# Patient Record
Sex: FEMALE | Race: WHITE
[De-identification: names, ages, dates, MRNs, and addresses within clinical notes are randomized per-mention and may not be internally consistent; named-entity substitution may affect disease eponyms.]

---

## 2017-01-30 ENCOUNTER — HOSPITAL ENCOUNTER (OUTPATIENT)
Dept: HOSPITAL 45 - C.MAMM | Age: 42
Discharge: HOME | End: 2017-01-30
Payer: COMMERCIAL

## 2017-01-30 DIAGNOSIS — Z12.31: Primary | ICD-10-CM

## 2017-01-30 NOTE — MAMMOGRAPHY REPORT
BILATERAL DIGITAL SCREENING MAMMOGRAM TOMOSYNTHESIS WITH CAD: 1/30/2017

CLINICAL HISTORY: Routine screening.  Patient has no complaints.  





TECHNIQUE:  Breast tomosynthesis in addition to standard 2D mammography was performed. Current study
 was also evaluated with a Computer Aided Detection (CAD) system.  



COMPARISON: Comparison is made to exams dated:  1/27/2016 mammogram, 1/27/2016 ultrasound - [facility], and 1/7/2010 mammogram - [facility].   



BREAST COMPOSITION:  The tissue of both breasts is almost entirely fatty.  



FINDINGS: There is evidence of prior reduction mammoplasty with scattered benign-appearing calcifica
tions in the breasts.  No suspicious mass, architectural distortion or cluster of microcalcification
s is seen.  



IMPRESSION:  ACR BI-RADS CATEGORY 1: NEGATIVE

There is no mammographic evidence of malignancy. A 1 year screening mammogram is recommended.  The p
atient will receive written notification of the results.  





Approximately 10% of breast cancers are not detected with mammography. A negative mammographic repor
t should not delay biopsy if a clinically suggestive mass is present.



Kassie Dover M.D.          

ay/:1/30/2017 16:21:22  



Imaging Technologist: Cortney Egan, [facility]

letter sent: Normal 1/2  

BI-RADS Code: ACR BI-RADS Category 1: Negative

## 2018-02-01 ENCOUNTER — HOSPITAL ENCOUNTER (OUTPATIENT)
Dept: HOSPITAL 45 - C.MAMM | Age: 43
Discharge: HOME | End: 2018-02-01
Payer: COMMERCIAL

## 2018-02-01 DIAGNOSIS — Z12.31: Primary | ICD-10-CM

## 2018-02-05 NOTE — MAMMOGRAPHY REPORT
BILATERAL DIGITAL SCREENING MAMMOGRAM TOMOSYNTHESIS WITH CAD: 2/1/2018

CLINICAL HISTORY: Routine screening.  Patient has no complaints.  





TECHNIQUE:  Breast tomosynthesis in addition to standard 2D mammography was performed. Current study 
was also evaluated with a Computer Aided Detection (CAD) system.  



COMPARISON: Comparison is made to exams dated:  1/30/2017 mammogram, 1/27/2016 ultrasound, 1/27/2016 
mammogram - [facility], 1/7/2010 mammogram, and 1/7/2010 ultrasound - [facility]
.   



BREAST COMPOSITION:  The tissue of both breasts is almost entirely fatty.  



FINDINGS:  No suspicious masses, calcifications, or areas of architectural distortion are noted in ei
ther breast. There has been no significant interval change compared to prior exams.  There are stable
 changes from bilateral reduction mammoplasty.  Scattered bilateral benign-appearing calcifications a
re again noted.



IMPRESSION:  ACR BI-RADS CATEGORY 2: BENIGN

There is no mammographic evidence of malignancy. A 1 year screening mammogram is recommended.  The pa
tient will receive written notification of the results.  





Approximately 10% of breast cancers are not detected with mammography. A negative mammographic report
 should not delay biopsy if a clinically suggestive mass is present.



Jacquie Moralez M.D.          

ah/:2/1/2018 16:35:10  



Imaging Technologist: Iris BUCHANAN)([place]), [facility]

letter sent: Normal 1/2  

BI-RADS Code: ACR BI-RADS Category 2: Benign